# Patient Record
Sex: FEMALE | Race: BLACK OR AFRICAN AMERICAN | NOT HISPANIC OR LATINO | Employment: FULL TIME | URBAN - METROPOLITAN AREA
[De-identification: names, ages, dates, MRNs, and addresses within clinical notes are randomized per-mention and may not be internally consistent; named-entity substitution may affect disease eponyms.]

---

## 2022-12-02 VITALS — WEIGHT: 195 LBS | HEIGHT: 60 IN | BODY MASS INDEX: 38.28 KG/M2

## 2022-12-02 DIAGNOSIS — Z86.010 PERSONAL HISTORY OF COLONIC POLYPS: Primary | ICD-10-CM

## 2022-12-02 DIAGNOSIS — Z12.11 SCREENING FOR COLON CANCER: ICD-10-CM

## 2022-12-02 NOTE — TELEPHONE ENCOUNTER
----- Message from Imani Yadav sent at 11/22/2022  3:04 PM CST -----  Regarding: FW: Appointment  Contact: patient    ----- Message -----  From: Deneen Coburn RN  Sent: 11/22/2022   3:03 PM CST  To: [facility] Gastroenterology Procedure Scheduling  Subject: FW: Appointment                                    ----- Message -----  From: Jaquelin Duffy  Sent: 11/22/2022   2:58 PM CST  To: [facility] Staff  Subject: Appointment                                      Per phone call with patient she stated that she would like to schedule an appointment to  have the colonoscopy to be done.  Please return call at [phone] (home).    Thanks,  SJ

## 2022-12-02 NOTE — TELEPHONE ENCOUNTER
Returned pt call to schedule 5 yr repeat colon w/ [facility].Updated chart in Epic. Reviewed instructions w/ pt who voiced understanding. Emailed instructions to pt per her request. - JARRETT

## 2022-12-06 RX ORDER — SOD SULF/POT CHLORIDE/MAG SULF 1.479 G
12 TABLET ORAL DAILY
Qty: 24 TABLET | Refills: 0 | Status: SHIPPED | OUTPATIENT
Start: 2022-12-06

## 2022-12-06 NOTE — TELEPHONE ENCOUNTER
[facility]  [address] [place] [address]  Phone: [phone]  Fax: [phone]    History & Physical         Provider: Dr. Jean López    Patient Name: Agnes CHAN (age):1960  61 y.o.           Gender: female   Phone: [phone]     Referring Physician: Primary Doctor No     Vital Signs:   Height - 5'  Weight - 195 lbs  BMI -  38.08    Plan: Colonoscopy @ [facility]    Encounter Diagnoses   Name Primary?    Personal history of colonic polyps Yes    Screening for colon cancer            History:      Past Medical History:   Diagnosis Date    BMI 31.0-31.9,adult     BMI 38.0-38.9,adult     Constipation     Migraines     Personal history of colonic polyps       Past Surgical History:   Procedure Laterality Date    BREAST LUMPECTOMY Bilateral      SECTION      COLONOSCOPY  2017    HYSTERECTOMY        Medication List with Changes/Refills   New Medications    SOD SULF-POT CHLORIDE-MAG SULF (SUTAB) 1.479-0.188- 0.225 GRAM TABLET    Take 12 tablets by mouth once daily. Take according to package instructions with indicated amount of water. No breakfast day before test. May substitute with Suprep, Clenpiq, Plenvu, Moviprep or GoLytely based on Rx plan and patient preference.      Review of patient's allergies indicates:   Allergen Reactions    Darvocet a500 [propoxyphene n-acetaminophen]     Darvon [propoxyphene]     Demerol [meperidine]     Hydrocodone-acetaminophen     Metronidazole     Opioids-meperidine and related     Oxycodone     Wellbutrin [bupropion hcl]       Family History   Problem Relation Age of Onset    Cirrhosis Father     Ulcerative colitis Neg Hx     Pancreatic cancer Neg Hx     Liver disease Neg Hx     Colon cancer Neg Hx     Throat cancer Neg Hx     Esophageal cancer Neg Hx     Crohn's disease Neg Hx     Stomach cancer Neg Hx       Social History     Tobacco Use     Smoking status: Never    Smokeless tobacco: Never   Substance Use Topics    Alcohol use: Never    Drug use: Never        Physical Examination:     General Appearance:___________________________  HEENT: _____________________________________  Abdomen:____________________________________  Heart:________________________________________  Lungs:_______________________________________  Extremities:___________________________________  Skin:_________________________________________  Endocrine:____________________________________  Genitourinary:_________________________________  Neurological:__________________________________      Patient has been evaluated immediately prior to sedation and is medically cleared for endoscopy with IVCS as an ASA class: ______      Physician Signature: _________________________       Date: ________  Time: ________

## 2022-12-19 ENCOUNTER — TELEPHONE (OUTPATIENT)
Dept: GASTROENTEROLOGY | Facility: CLINIC | Age: 62
End: 2022-12-19
Payer: COMMERCIAL

## 2022-12-19 DIAGNOSIS — Z86.010 HISTORY OF COLON POLYPS: Primary | ICD-10-CM

## 2022-12-19 DIAGNOSIS — K21.9 GASTROESOPHAGEAL REFLUX DISEASE, UNSPECIFIED WHETHER ESOPHAGITIS PRESENT: ICD-10-CM

## 2022-12-19 DIAGNOSIS — R10.13 EPIGASTRIC PAIN: ICD-10-CM

## 2022-12-19 NOTE — TELEPHONE ENCOUNTER
----- Message from Jaquelin Duffy sent at 12/19/2022  3:10 PM CST -----  Regarding: EDG  Contact: patient  Per phone call with Linh, she is checking the status of and EDG to be done with the colonoscopy.  This information was faxed over on 12/08/202 and Linh called on 12/13/2022 and no response has been given.  Please return call at [phone].        Thanks,  SJ

## 2022-12-19 NOTE — TELEPHONE ENCOUNTER
Called Linh back at dr. Christianson's and let know we did receive there request for us to add EGD to colonoscopy scheduled 01/25/2023 and has been forwarded to [facility] and [facility] for review. Will call back when we get a response.

## 2022-12-20 NOTE — TELEPHONE ENCOUNTER
Contacted pt to inform her EGD was added to the Colonoscopy procedure on 01/25/22. Pt confirmed that her last Diverticulitis episode was in October and would notify us if she has another one prior to her procedure. - VL

## 2022-12-20 NOTE — TELEPHONE ENCOUNTER
Referral reviewed from Dr. Christianson. She would like EGD to be added to colonoscopy for epigastric pain/reflux. Patient also has h/o recurrent diverticulitis and LLQ pain. Okay to add EGD to already scheduled colonoscopy. Also make sure patient has not had any episodes of diverticulitis within 3 months prior to her colonoscopy because this would need to be rescheduled. New order created for EGD/Colonoscopy. Update Epic schedule and resend order to [facility].   [facility]

## 2022-12-21 NOTE — TELEPHONE ENCOUNTER
[facility]  [address] [place] [address]  Phone: [phone]  Fax: [phone]    History & Physical         Provider: Dr. Jean López    Patient Name: Agnes CHAN (age):1960  62 y.o.           Gender: female   Phone: [phone]     Referring Physician: Naina Christianson     Vital Signs:   Height - 5'  Weight - 195 lbs  BMI -  38.08    Plan: EGD / Colonoscopy @ [facility]    Encounter Diagnoses   Name Primary?    History of colon polyps Yes    Epigastric pain     Gastroesophageal reflux disease, unspecified whether esophagitis present            History:      Past Medical History:   Diagnosis Date    BMI 31.0-31.9,adult     BMI 38.0-38.9,adult     Constipation     Migraines     Personal history of colonic polyps       Past Surgical History:   Procedure Laterality Date    BREAST LUMPECTOMY Bilateral      SECTION      COLONOSCOPY  2017    HYSTERECTOMY        Medication List with Changes/Refills   Current Medications    SOD SULF-POT CHLORIDE-MAG SULF (SUTAB) 1.479-0.188- 0.225 GRAM TABLET    Take 12 tablets by mouth once daily. Take according to package instructions with indicated amount of water. No breakfast day before test. May substitute with Suprep, Clenpiq, Plenvu, Moviprep or GoLytely based on Rx plan and patient preference.      Review of patient's allergies indicates:   Allergen Reactions    Darvocet a500 [propoxyphene n-acetaminophen]     Darvon [propoxyphene]     Demerol [meperidine]     Hydrocodone-acetaminophen     Metronidazole     Opioids-meperidine and related     Oxycodone     Wellbutrin [bupropion hcl]       Family History   Problem Relation Age of Onset    Cirrhosis Father     Ulcerative colitis Neg Hx     Pancreatic cancer Neg Hx     Liver disease Neg Hx     Colon cancer Neg Hx     Throat cancer Neg Hx     Esophageal cancer Neg Hx     Crohn's disease Neg Hx      Stomach cancer Neg Hx       Social History     Tobacco Use    Smoking status: Never    Smokeless tobacco: Never   Substance Use Topics    Alcohol use: Never    Drug use: Never        Physical Examination:     General Appearance:___________________________  HEENT: _____________________________________  Abdomen:____________________________________  Heart:________________________________________  Lungs:_______________________________________  Extremities:___________________________________  Skin:_________________________________________  Endocrine:____________________________________  Genitourinary:_________________________________  Neurological:__________________________________      Patient has been evaluated immediately prior to sedation and is medically cleared for endoscopy with IVCS as an ASA class: ______      Physician Signature: _________________________       Date: ________  Time: ________

## 2023-01-25 ENCOUNTER — OUTSIDE PLACE OF SERVICE (OUTPATIENT)
Dept: GASTROENTEROLOGY | Facility: CLINIC | Age: 63
End: 2023-01-25

## 2023-01-25 LAB — CRC RECOMMENDATION EXT: NORMAL

## 2023-01-25 PROCEDURE — 43235 PR EGD, FLEX, DIAGNOSTIC: ICD-10-PCS | Mod: 51,,, | Performed by: INTERNAL MEDICINE

## 2023-01-25 PROCEDURE — 43235 EGD DIAGNOSTIC BRUSH WASH: CPT | Mod: 51,,, | Performed by: INTERNAL MEDICINE

## 2023-01-25 PROCEDURE — G0105 COLORECTAL SCRN; HI RISK IND: HCPCS | Mod: ,,, | Performed by: INTERNAL MEDICINE

## 2023-01-25 PROCEDURE — G0105 COLORECTAL SCRN; HI RISK IND: ICD-10-PCS | Mod: ,,, | Performed by: INTERNAL MEDICINE

## 2023-01-27 ENCOUNTER — TELEPHONE (OUTPATIENT)
Dept: GASTROENTEROLOGY | Facility: CLINIC | Age: 63
End: 2023-01-27

## 2023-01-27 NOTE — TELEPHONE ENCOUNTER
----- Message from Marixa Prince sent at 1/27/2023  9:47 AM CST -----  Type:  Patient Returning Call    Who Called:Agnes Yip    Who Left Message for Patient: Sirisha   Does the patient know what this is regarding?: f/u after procedure   Would the patient rather a call back or a response via [facility]?    Best Call Back Number: [phone]      Additional Information: returning your call, still having abdominal issues and has not has a bm since before surgery please call

## 2023-01-27 NOTE — TELEPHONE ENCOUNTER
Experiencing abdominal pain beween breast bone and navel and have not had a BM or passed any gas since her colonoscopy on 1/25.

## 2023-02-06 ENCOUNTER — DOCUMENTATION ONLY (OUTPATIENT)
Dept: GASTROENTEROLOGY | Facility: CLINIC | Age: 63
End: 2023-02-06
Payer: COMMERCIAL

## 2023-03-24 ENCOUNTER — TELEPHONE (OUTPATIENT)
Dept: GASTROENTEROLOGY | Facility: CLINIC | Age: 63
End: 2023-03-24
Payer: COMMERCIAL

## 2023-03-24 NOTE — TELEPHONE ENCOUNTER
I spoke to patient and she states she had a recent Colonoscopy and everything was ok.----- Message from Agnes Lemon sent at 3/24/2023 10:42 AM CDT -----  Contact: self  Pt received a letter stating she needs to schedule a re-check pls advise pt [phone] of details of appt